# Patient Record
Sex: MALE | Race: WHITE | Employment: FULL TIME | URBAN - NONMETROPOLITAN AREA
[De-identification: names, ages, dates, MRNs, and addresses within clinical notes are randomized per-mention and may not be internally consistent; named-entity substitution may affect disease eponyms.]

---

## 2020-08-27 ENCOUNTER — OFFICE VISIT (OUTPATIENT)
Dept: INTERNAL MEDICINE CLINIC | Age: 25
End: 2020-08-27
Payer: COMMERCIAL

## 2020-08-27 VITALS
SYSTOLIC BLOOD PRESSURE: 142 MMHG | DIASTOLIC BLOOD PRESSURE: 76 MMHG | TEMPERATURE: 99.8 F | BODY MASS INDEX: 22.12 KG/M2 | HEIGHT: 71 IN | WEIGHT: 158 LBS | HEART RATE: 64 BPM

## 2020-08-27 PROCEDURE — 99202 OFFICE O/P NEW SF 15 MIN: CPT | Performed by: NURSE PRACTITIONER

## 2020-08-27 RX ORDER — AMOXICILLIN AND CLAVULANATE POTASSIUM 875; 125 MG/1; MG/1
1 TABLET, FILM COATED ORAL EVERY 12 HOURS
Qty: 20 TABLET | Refills: 0 | Status: SHIPPED | OUTPATIENT
Start: 2020-08-27 | End: 2020-09-06

## 2020-08-27 NOTE — PATIENT INSTRUCTIONS
Patient Education        Ear Infection (Otitis Media): Care Instructions  Your Care Instructions     An ear infection may start with a cold and affect the middle ear (otitis media). It can hurt a lot. Most ear infections clear up on their own in a couple of days. Most often you will not need antibiotics. This is because many ear infections are caused by a virus. Antibiotics don't work against a virus. Regular doses of pain medicines are the best way to reduce your fever and help you feel better. Follow-up care is a key part of your treatment and safety. Be sure to make and go to all appointments, and call your doctor if you are having problems. It's also a good idea to know your test results and keep a list of the medicines you take. How can you care for yourself at home? · Take pain medicines exactly as directed. ? If the doctor gave you a prescription medicine for pain, take it as prescribed. ? If you are not taking a prescription pain medicine, take an over-the-counter medicine, such as acetaminophen (Tylenol), ibuprofen (Advil, Motrin), or naproxen (Aleve). Read and follow all instructions on the label. ? Do not take two or more pain medicines at the same time unless the doctor told you to. Many pain medicines have acetaminophen, which is Tylenol. Too much acetaminophen (Tylenol) can be harmful. · Plan to take a full dose of pain reliever before bedtime. Getting enough sleep will help you get better. · Try a warm, moist washcloth on the ear. It may help relieve pain. · If your doctor prescribed antibiotics, take them as directed. Do not stop taking them just because you feel better. You need to take the full course of antibiotics. When should you call for help? Call your doctor now or seek immediate medical care if:  · You have new or increasing ear pain. · You have new or increasing pus or blood draining from your ear. · You have a fever with a stiff neck or a severe headache.   Watch closely for changes in your health, and be sure to contact your doctor if:  · You have new or worse symptoms. · You are not getting better after taking an antibiotic for 2 days. Where can you learn more? Go to [email or web address] and sign in to your MEDEM account. Enter B085 in the [facility] box to learn more about \"Ear Infection (Otitis Media): Care Instructions. \"     If you do not have an account, please click on the \"Sign Up Now\" link. Current as of: July 29, 2019               Content Version: 12.5  © 7498-4425 Healthwise, Incorporated. Care instructions adapted under license by [facility] ([facility]). If you have questions about a medical condition or this instruction, always ask your healthcare professional. [address] any warranty or liability for your use of this information.

## 2020-08-27 NOTE — PROGRESS NOTES
Subjective     Lizzette Barnett, 22 y.o. male, presents with left ear pain. Symptoms started 3 days ago. He is taking fluids well. There are no other significant complaints. Objective     BP (!) 142/76   Pulse 64   Temp 99.8 °F (37.7 °C)   Ht 5' 11\" (1.803 m)   Wt 158 lb (71.7 kg)   BMI 22.04 kg/m²     General appearance:  well developed and well nourished   Nasal:  Neck:  Mild nasal congestion with clear rhinorrhea  Neck is supple   Ears:  External ears are normal  Right TM - normal landmarks and mobility  Left TM - diminished mobility, bulging and serous middle ear fluid   Oropharynx:  Mucous membranes are moist; there is mild erythema of the posterior pharynx   Lungs:  Lungs are clear to auscultation   Heart:  Regular rate and rhythm; no murmurs or rubs   Skin:  No rashes or lesions noted     Assessment     Acute left otitis media    Plan     1) Antibiotics per orders  2) Fluids, acetaminophen as needed  3) Recheck if symptoms persist for 2 or more days, symptoms worsen, or new symptoms develop. Subjective:      Lizzette Barnett is a 22 y.o. male who presents for possible ear infection. Symptoms include plugged sensation in the left ear, muffled sounds and slight pain. Onset of symptoms was 1 day ago, gradually worsening since that time. Review of Systems  Ears, nose, mouth, throat, and face: positive for earaches    Objective:   BP (!) 142/76   Pulse 64   Temp 99.8 °F (37.7 °C)   Ht 5' 11\" (1.803 m)   Wt 158 lb (71.7 kg)   BMI 22.04 kg/m²   General:  alert, appears stated age and cooperative   Right Ear: normal appearance   Left Ear: left TM bulging and serous middle ear fluid   Mouth:  lips, mucosa, and tongue normal; teeth and gums normal   Neck: no adenopathy, no carotid bruit, no JVD, supple, symmetrical, trachea midline and thyroid not enlarged, symmetric, no tenderness/mass/nodules. Assessment:     left acute otitis media.     Plan:     Treatment: Augmentin, Cipro-HC otic  OTC ibuprofen, fluids, rest, avoid carbonated/alcoholic or caffeinated beverages. Follow up in 3 days if not improving.

## 2020-09-21 ENCOUNTER — OFFICE VISIT (OUTPATIENT)
Dept: INTERNAL MEDICINE CLINIC | Age: 25
End: 2020-09-21
Payer: COMMERCIAL

## 2020-09-21 VITALS
SYSTOLIC BLOOD PRESSURE: 122 MMHG | WEIGHT: 153 LBS | HEIGHT: 72 IN | DIASTOLIC BLOOD PRESSURE: 70 MMHG | HEART RATE: 78 BPM | TEMPERATURE: 97.9 F | BODY MASS INDEX: 20.72 KG/M2

## 2020-09-21 PROCEDURE — 99213 OFFICE O/P EST LOW 20 MIN: CPT | Performed by: NURSE PRACTITIONER

## 2020-09-21 PROCEDURE — 96372 THER/PROPH/DIAG INJ SC/IM: CPT | Performed by: NURSE PRACTITIONER

## 2020-09-21 RX ORDER — CYCLOBENZAPRINE HCL 5 MG
5 TABLET ORAL 3 TIMES DAILY PRN
Qty: 10 TABLET | Refills: 0 | Status: SHIPPED | OUTPATIENT
Start: 2020-09-21 | End: 2021-12-27

## 2020-09-21 RX ORDER — PREDNISONE 20 MG/1
40 TABLET ORAL DAILY
Qty: 10 TABLET | Refills: 0 | Status: SHIPPED | OUTPATIENT
Start: 2020-09-21 | End: 2020-09-26

## 2020-09-21 RX ORDER — KETOROLAC TROMETHAMINE 30 MG/ML
30 INJECTION, SOLUTION INTRAMUSCULAR; INTRAVENOUS ONCE
Status: COMPLETED | OUTPATIENT
Start: 2020-09-21 | End: 2020-09-21

## 2020-09-21 RX ADMIN — KETOROLAC TROMETHAMINE 30 MG: 30 INJECTION, SOLUTION INTRAMUSCULAR; INTRAVENOUS at 11:32

## 2020-09-21 ASSESSMENT — PATIENT HEALTH QUESTIONNAIRE - PHQ9
SUM OF ALL RESPONSES TO PHQ QUESTIONS 1-9: 0
2. FEELING DOWN, DEPRESSED OR HOPELESS: 0
1. LITTLE INTEREST OR PLEASURE IN DOING THINGS: 0
SUM OF ALL RESPONSES TO PHQ9 QUESTIONS 1 & 2: 0
SUM OF ALL RESPONSES TO PHQ QUESTIONS 1-9: 0

## 2020-09-21 NOTE — LETTER
[id number] [facility]  [address] [place] [id number]  Phone: [phone]  Fax: [phone]    JAKE Merritt CNP        September 21, 2020     Patient: Lindsey Fuentes   YOB: 1995   Date of Visit: 9/21/2020       To Whom it May Concern:    Lindsey Fuentes was seen in my clinic on 9/21/2020. He may return to work on 09/22/2020. If you have any questions or concerns, please don't hesitate to call.     Sincerely,           JAKE Merritt CNP

## 2020-09-21 NOTE — PATIENT INSTRUCTIONS
Thank you for enrolling in [address] Please follow the instructions below to securely access your online medical record. iTracs allows you to send messages to your doctor, view your test results, renew your prescriptions, schedule appointments, and more. How Do I Sign Up? 1. In your Internet browser, go to [email or web address]  2. Click on the Sign Up Now link in the Sign In box. You will see the New Member Sign Up page. 3. Enter your iTracs Access Code exactly as it appears below. You will not need to use this code after youve completed the sign-up process. If you do not sign up before the expiration date, you must request a new code. iTracs Access Code: [email or web address]  Expires: 10/11/2020  6:49 PM    4. Enter your Social Security Number ([id number]) and Date of Birth (mm/dd/yyyy) as indicated and click Submit. You will be taken to the next sign-up page. 5. Create a iTracs ID. This will be your iTracs login ID and cannot be changed, so think of one that is secure and easy to remember. 6. Create a iTracs password. You can change your password at any time. 7. Enter your Password Reset Question and Answer. This can be used at a later time if you forget your password. 8. Enter your e-mail address. You will receive e-mail notification when new information is available in [address] 9. Click Sign Up. You can now view your medical record. Additional Information  If you have questions, please contact the physician practice where you receive care. Remember, iTracs is NOT to be used for urgent needs. For medical emergencies, dial 911. For questions regarding your iTracs account call [phone]. If you have a clinical question, please call your doctor's office. Patient Education        Neck Pain: Care Instructions  Your Care Instructions     You can have neck pain anywhere from the bottom of your head to the top of your shoulders.  It can spread to the upper back or account. Enter [id number] in the [facility] box to learn more about \"Neck Pain: Care Instructions. \"     If you do not have an account, please click on the \"Sign Up Now\" link. Current as of: March 2, 2020               Content Version: 12.5  © 8178-4386 Healthwise, Incorporated. Care instructions adapted under license by [facility] ([facility]). If you have questions about a medical condition or this instruction, always ask your healthcare professional. [address] any warranty or liability for your use of this information.

## 2021-05-14 ENCOUNTER — APPOINTMENT (OUTPATIENT)
Dept: GENERAL RADIOLOGY | Age: 26
End: 2021-05-14
Payer: COMMERCIAL

## 2021-05-14 ENCOUNTER — HOSPITAL ENCOUNTER (EMERGENCY)
Age: 26
Discharge: HOME | End: 2021-05-14
Attending: EMERGENCY MEDICINE
Payer: COMMERCIAL

## 2021-05-14 VITALS
TEMPERATURE: 98.2 F | HEART RATE: 94 BPM | RESPIRATION RATE: 18 BRPM | SYSTOLIC BLOOD PRESSURE: 135 MMHG | OXYGEN SATURATION: 100 % | DIASTOLIC BLOOD PRESSURE: 82 MMHG

## 2021-05-14 DIAGNOSIS — S93.402A SPRAIN OF LEFT ANKLE, UNSPECIFIED LIGAMENT, INITIAL ENCOUNTER: ICD-10-CM
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
## 2021-05-14 PROCEDURE — 73610 X-RAY EXAM OF ANKLE: CPT

## 2021-05-14 PROCEDURE — 73630 X-RAY EXAM OF FOOT: CPT

## 2021-05-14 PROCEDURE — 99283 EMERGENCY DEPT VISIT LOW MDM: CPT

## 2021-05-14 ASSESSMENT — PAIN DESCRIPTION - ORIENTATION: ORIENTATION: LEFT

## 2021-05-14 ASSESSMENT — PAIN SCALES - GENERAL: PAINLEVEL_OUTOF10: 6

## 2021-05-14 ASSESSMENT — PAIN DESCRIPTION - PAIN TYPE: TYPE: ACUTE PAIN

## 2021-05-14 NOTE — ED PROVIDER NOTES
Emergency Department Encounter    Patient: Nikhil Burger  MRN: [id number]  : 1995  Date of Evaluation: 2021  ED Provider:  [facility]    Triage Chief Complaint:   Foot Injury (left foot injury at work pt slipped off ladder onto side of foot)      [place]:  Nikhil Burger is a 26 y.o. male that presents to the emergency department for evaluation of left ankle pain. Patient notes a prior to arrival, he was at work. He details cars. Patient notes that he was on the step tool, approximately 3 feet in the air. He slipped and fell. Landed on his left ankle. Believe he rolled his left ankle. Is able to weight-bear, has pain on the lateral portion. No numbness, tingling, paresthesias. No open fractures, lacerations, abrasions, deformities. No pain or injury elsewhere. No loss of bowel or bladder function. No blunt head trauma or loss of consciousness. Denies additional precipitating, modifying, alleviating features. No history of bone or connective tissue disorders. ROS - see HPI, below listed is current ROS at time of my eval:  General:  No fevers  Musculoskeletal: Left ankle pain  Skin:  No rash, no pruritis, no easy bruising  Neurologic:   no extremity numbness, no extremity tingling, no extremity weakness  Extremities: No cyanosis    Past Medical History:   Diagnosis Date    Asthma     Pneumonia            History reviewed. No pertinent surgical history. History reviewed. No pertinent family history.     Social History     Socioeconomic History    Marital status: Single     Spouse name: Not on file    Number of children: Not on file    Years of education: Not on file    Highest education level: Not on file   Occupational History    Not on file   Social Needs    Financial resource strain: Not on file    Food insecurity     Worry: Not on file     Inability: Not on file    Transportation needs     Medical: Not on file     Non-medical: Not on file   Tobacco Use    Smoking status: Never Smoker    Smokeless tobacco: Never Used   Substance and Sexual Activity    Alcohol use: No    Drug use: No    Sexual activity: Not Currently   Lifestyle    Physical activity     Days per week: Not on file     Minutes per session: Not on file    Stress: Not on file   Relationships    Social connections     Talks on phone: Not on file     Gets together: Not on file     Attends Anabaptism service: Not on file     Active member of club or organization: Not on file     Attends meetings of clubs or organizations: Not on file     Relationship status: Not on file    Intimate partner violence     Fear of current or ex partner: Not on file     Emotionally abused: Not on file     Physically abused: Not on file     Forced sexual activity: Not on file   Other Topics Concern    Not on file   Social History Narrative    Not on file       No current facility-administered medications for this encounter. Current Outpatient Medications   Medication Sig Dispense Refill    cyclobenzaprine (FLEXERIL) 5 MG tablet Take 1 tablet by mouth 3 times daily as needed for Muscle spasms 10 tablet 0       Allergies   Allergen Reactions    Phenergan [Promethazine Hcl] Hives       Nursing Notes Reviewed    Physical Exam:  Triage VS:    ED Triage Vitals [05/14/21 1643]   Enc Vitals Group      /82      Pulse 94      Resp 18      Temp 98.2 °F (36.8 °C)      Temp Source Oral      SpO2 100 %       General appearance: Patient is awake and alert. He is following commands and answering questions. He is nontoxic in appearance. Skin:  Warm. Dry. Intact. No lacerations, abrasions, deformities, open fractures. Heart: Regular rate and rhythm. Audible S1 and S2. No audible murmurs, rubs, gallops. Lungs: Breath sounds clear and equal bilaterally. Back: No tenderness to palpation at the midline of the cervical spine, thoracic spine, lumbar spine. No step-offs or deformities.   No clinical signs of cauda equina syndrome. Extremity: No clubbing or cyanosis. Examination is focused on the left lower extremity. 5/5 strength in bilateral hip flexion, knee extension, ankle and great toe plantar and dorsiflexion. At the hip there is negative logroll. No pain with axial compression. At the knee there is no varus or valgus deformity. Negative Ana's testing. Negative anterior and posterior right knee drawer test.  At the ankle, negative anterior and posterior ankle drawer test.  There is pain on the lateral portion of the left ankle. There is no plantar ecchymosis. No pain over the first through fifth metatarsals. Negative Viramontes test.  Neurological: Sensation is intact to light touch and two-point discrimination in the L3-S1 dermatomal distribution of bilateral lower extremities. 2/4 patellar and achilles deep tendon reflexes. Perfusion:  2/4 femoral, DP, PT pulses in the lower extremities. Compartments are soft and compressible. No signs of compartment syndrome. Capillary refill is brisk and less than 2 seconds. Negative Homans sign bilateral lower extremity. I have reviewed and interpreted all of the currently available diagnostic results from this visit     Labs:  No results found for this visit on 05/14/21. Radiographs:  Radiologist's Report Reviewed:  Xr Ankle Left (min 3 Views)    Result Date: 5/14/2021  EXAMINATION: THREE XRAY VIEWS OF THE LEFT FOOT; THREE XRAY VIEWS OF THE LEFT ANKLE 5/14/2021 4:52 pm COMPARISON: None HISTORY: ORDERING SYSTEM PROVIDED HISTORY: pain TECHNOLOGIST PROVIDED HISTORY: Reason for exam:->pain Reason for Exam: Foot Injury (left foot injury at work pt slipped off ladder onto side of foot) Acuity: Acute Type of Exam: Initial Mechanism of Injury: Foot Injury (left foot injury at work pt slipped off ladder onto side of foot) FINDINGS: Left ankle: Talar dome contour is intact. Ankle mortise is preserved. No fracture or dislocation. Left foot: Joint spaces are preserved. Alignment is anatomic. Lisfranc alignment is maintained. No visible fracture or periosteal change. 1. No acute radiographic finding to account for patient's left foot or ankle pain. Xr Foot Left (min 3 Views)    Result Date: 5/14/2021  EXAMINATION: THREE XRAY VIEWS OF THE LEFT FOOT; THREE XRAY VIEWS OF THE LEFT ANKLE 5/14/2021 4:52 pm COMPARISON: None HISTORY: ORDERING SYSTEM PROVIDED HISTORY: pain TECHNOLOGIST PROVIDED HISTORY: Reason for exam:->pain Reason for Exam: Foot Injury (left foot injury at work pt slipped off ladder onto side of foot) Acuity: Acute Type of Exam: Initial Mechanism of Injury: Foot Injury (left foot injury at work pt slipped off ladder onto side of foot) FINDINGS: Left ankle: Talar dome contour is intact. Ankle mortise is preserved. No fracture or dislocation. Left foot: Joint spaces are preserved. Alignment is anatomic. Lisfranc alignment is maintained. No visible fracture or periosteal change. 1. No acute radiographic finding to account for patient's left foot or ankle pain. MDM:  Patient was seen and evaluated in the emergency department by myself. A thorough history and physical exam were performed, prior medical records reviewed. Upon arrival, patient's vital signs were noted and patient is hemodynamically stable. Left foot and ankle x-ray radiology report reads no radiographic findings to account for patient's left foot or ankle pain. Patient has no knee pain. Low clinical suspicion for Maisonneuve fracture. No plantar ecchymosis. Low suspicion for Dougherty fracture or Lisfranc injury. Patient is provided ankle brace. Instructed to follow-up with primary care provider for reevaluation. May require additional imaging. Instructed to return to the emergency department immediately with any new, worsening, concerning symptoms. Additional verbal and printed discharge instructions are provided. Patient verbalizes understanding is agreeable discharge plan. Is discharged in stable condition. Clinical Impression:  1. Fall from ladder, initial encounter    2. Sprain of left ankle, unspecified ligament, initial encounter        Disposition referral:  [facility] Emergency Department  [address] [address]  [phone]  Go to   Immediately with any new, worsening, concerning symptoms. [facility]  [address] [facility]  [phone]  In 3 days  Please follow-up with primary care physician for reevaluation and to establish care. You may require additional radiographic imaging including MRI. ED Provider Disposition:  DISPOSITION Decision To Discharge 05/14/2021 06:01:32 PM      Comment: Please note this report has been produced using speech recognition software and may contain errors related to that system including errors in grammar, punctuation, and spelling, as well as words and phrases that may be inappropriate. If there are any questions or concerns please feel free to contact the dictating provider for clarification.         Leon Godfrey,   05/14/21 [id number]

## 2021-12-27 ENCOUNTER — OFFICE VISIT (OUTPATIENT)
Dept: INTERNAL MEDICINE CLINIC | Age: 26
End: 2021-12-27

## 2021-12-27 ENCOUNTER — HOSPITAL ENCOUNTER (OUTPATIENT)
Dept: GENERAL RADIOLOGY | Age: 26
Discharge: HOME | End: 2021-12-27

## 2021-12-27 ENCOUNTER — HOSPITAL ENCOUNTER (OUTPATIENT)
Age: 26
Discharge: HOME | End: 2021-12-27

## 2021-12-27 VITALS — OXYGEN SATURATION: 97 % | TEMPERATURE: 97.1 F | HEART RATE: 95 BPM

## 2021-12-27 DIAGNOSIS — S16.1XXA NECK MUSCLE STRAIN, INITIAL ENCOUNTER: Primary | ICD-10-CM

## 2021-12-27 DIAGNOSIS — S16.1XXA NECK MUSCLE STRAIN, INITIAL ENCOUNTER: ICD-10-CM

## 2021-12-27 PROCEDURE — 99212 OFFICE O/P EST SF 10 MIN: CPT | Performed by: NURSE PRACTITIONER

## 2021-12-27 PROCEDURE — 72050 X-RAY EXAM NECK SPINE 4/5VWS: CPT

## 2021-12-27 RX ORDER — IBUPROFEN 800 MG/1
800 TABLET ORAL
Qty: 90 TABLET | Refills: 0 | Status: SHIPPED | OUTPATIENT
Start: 2021-12-27

## 2021-12-27 RX ORDER — PREDNISONE 20 MG/1
20 TABLET ORAL DAILY
Qty: 5 TABLET | Refills: 0 | Status: SHIPPED | OUTPATIENT
Start: 2021-12-27 | End: 2022-01-01

## 2021-12-27 RX ORDER — CYCLOBENZAPRINE HCL 10 MG
10 TABLET ORAL 3 TIMES DAILY PRN
Qty: 21 TABLET | Refills: 0 | Status: SHIPPED | OUTPATIENT
Start: 2021-12-27 | End: 2022-01-03

## 2021-12-27 NOTE — PROGRESS NOTES
Subjective:       Flonnie Hashimoto is a 23 y.o. male who presents for evaluation of neck pain. Event that precipitated these symptoms: none known. Onset of symptoms was 2 days ago, and have been gradually worsening since that time. Current symptoms are pain in right lateral muscle of the neck  (aching, constant and pinching in character; 7/10 in severity). Patient denies numbness in neck or extremeties. Patient has had recurrent self limited episodes of neck pain in the past. Previous treatments: none. Patient's medications, allergies, past medical, surgical, social and family histories were reviewed and updated as appropriate. Review of Systems  Pertinent items are noted in HPI. Objective: There were no vitals taken for this visit. General:   alert, appears stated age and cooperative   External Deformity:  absent   ROM Cervical Spine:  Abnormal ROM, flexion and extension are ROM, left rotation to 45 degrees, right rotation to 45 degrees, left and right lateral minimal ability without pain degrees    Midline Tenderness:  absent midline   Paraspinous tenderness:  absent midline   UE Neurologic Exam:  unremarkable     X-ray of the cervical spine: ordered at this time      Assessment:      Cervical strain      Plan:      Discussed the cervical pain, its course and treatment. Educational material distributed. Discussed appropriate exercises. Discussed appropriate use of ice and heat. NSAIDs per medication orders. Plain film x-rays. PT referral.      1. Neck muscle strain, initial encounter  Pt returns for a neck stain. Pt has been treated in this office before for same pain. Talked with pt about PT and long term care and treatment. Will order xray and PT for further evaluation. - cyclobenzaprine (FLEXERIL) 10 MG tablet; Take 1 tablet by mouth 3 times daily as needed for Muscle spasms  Dispense: 21 tablet; Refill: 0  - [facility]  - XR CERVICAL SPINE (4-5 VIEWS);  Future  - predniSONE (DELTASONE) 20 MG tablet; Take 1 tablet by mouth daily for 5 days  Dispense: 5 tablet;  Refill: 0

## 2022-01-13 ENCOUNTER — NURSE ONLY (OUTPATIENT)
Dept: INTERNAL MEDICINE CLINIC | Age: 27
End: 2022-01-13

## 2022-01-13 VITALS
TEMPERATURE: 97.6 F | OXYGEN SATURATION: 97 % | BODY MASS INDEX: 21.67 KG/M2 | HEART RATE: 86 BPM | HEIGHT: 72 IN | WEIGHT: 160 LBS

## 2022-01-13 DIAGNOSIS — R05.9 COUGH: Primary | ICD-10-CM

## 2022-01-13 NOTE — PROGRESS NOTES
1/13/22  Shawn Abrams  1995    FLU/COVID-19 CLINIC EVALUATION    HPI SYMPTOMS:    Employer:    [x] Fevers  [x] Chills  [x] Cough  [] Coughing up blood  [x] Chest Congestion  [x] Nasal Congestion  [x] Feeling short of breath  [x] Sometimes  [] Frequently  [] All the time  [x] Chest pain  [x] Headaches  [x]Tolerable  [] Severe  [x] Sore throat  [x] Muscle aches  [] Nausea  [] Vomiting  []Unable to keep fluids down  [] Diarrhea  []Severe    [x] OTHER SYMPTOMS:  fatigue  Symptom Duration:   [] 1  [x] 2   [] 3   [] 4    [] 5   [] 6   [] 7   [] 8   [] 9   [] 10   [] 11   [] 12   [] 13   [] 14   [] Longer than 14 days    Symptom course:   [x] Worsening     [] Stable     [] Improving    RISK FACTORS:    [] Pregnant or possibly pregnant  [] Age over 61  [] Diabetes  [] Heart disease  [] Asthma  [] COPD/Other chronic lung diseases  [] Active Cancer  [] On Chemotherapy  [] Taking oral steroids  [] History Lymphoma/Leukemia  [] Close contact with a lab confirmed COVID-19 patient within 14 days of symptom onset  [] History of travel from affected geographical areas within 14 days of symptom onset       VITALS:  Vitals:    01/13/22 1412   Pulse: 86   Temp: 97.6 °F (36.4 °C)   SpO2: 97%   Weight: 160 lb (72.6 kg)   Height: 6' (1.829 m)          Pt has had only one injection of vaccine. An  electronic signature was used to authenticate this note.      --Mauricio Salguero MA on 1/13/2022 at 2:12 PM

## 2022-01-14 LAB — SARS-COV-2: NOT DETECTED

## 2024-01-04 ENCOUNTER — OFFICE VISIT (OUTPATIENT)
Dept: INTERNAL MEDICINE CLINIC | Age: 29
End: 2024-01-04
Payer: COMMERCIAL

## 2024-01-04 VITALS
OXYGEN SATURATION: 99 % | WEIGHT: 159.6 LBS | TEMPERATURE: 97.3 F | HEART RATE: 88 BPM | BODY MASS INDEX: 21.65 KG/M2 | RESPIRATION RATE: 16 BRPM

## 2024-01-04 DIAGNOSIS — J02.0 STREP THROAT: ICD-10-CM

## 2024-01-04 DIAGNOSIS — J02.9 SORE THROAT: Primary | ICD-10-CM

## 2024-01-04 LAB — S PYO AG THROAT QL: POSITIVE

## 2024-01-04 PROCEDURE — 99213 OFFICE O/P EST LOW 20 MIN: CPT | Performed by: NURSE PRACTITIONER

## 2024-01-04 PROCEDURE — 87880 STREP A ASSAY W/OPTIC: CPT | Performed by: NURSE PRACTITIONER

## 2024-01-04 RX ORDER — AMOXICILLIN 500 MG/1
500 CAPSULE ORAL 2 TIMES DAILY
Qty: 14 CAPSULE | Refills: 0 | Status: SHIPPED | OUTPATIENT
Start: 2024-01-04 | End: 2024-01-11

## 2024-01-04 ASSESSMENT — ENCOUNTER SYMPTOMS
COUGH: 0
SINUS PAIN: 0
VOMITING: 0
SHORTNESS OF BREATH: 0
SORE THROAT: 1
TROUBLE SWALLOWING: 0
ABDOMINAL PAIN: 0
SINUS PRESSURE: 0
CHEST TIGHTNESS: 0
NAUSEA: 0
RHINORRHEA: 0
SWOLLEN GLANDS: 1
CHANGE IN BOWEL HABIT: 0
VISUAL CHANGE: 0

## 2024-01-04 NOTE — PATIENT INSTRUCTIONS
Take your antibiotics as directed. Do not stop taking them just because you feel better. You need to take the full course of antibiotics.  Strep throat can spread to others until 24 hours after you begin taking antibiotics. During this time, avoid contact with other people at work, school, or home, especially infants and children. Do not sneeze or cough on others, and wash your hands often. Keep your drinking glass and eating utensils separate from those of others. Wash these items well in hot, soapy water.  Gargle with warm salt water several times a day to help reduce swelling and relieve pain. Mix 1/2 teaspoon of salt in 1 cup of warm water.  Take an over-the-counter pain medication, such as acetaminophen (Tylenol), ibuprofen (Advil, Motrin), or naproxen (Aleve). Read and follow all instructions on the label.  Try an over-the-counter anesthetic throat spray or throat lozenges, which may help relieve throat pain.  Drink plenty of fluids. Fluids may help soothe an irritated throat. Hot fluids, such as tea or soup, may help your throat feel better.  Eat soft solids and drink plenty of clear liquids. Flavored ice pops, ice cream, scrambled eggs, sherbet, and gelatin dessert (such as Jell-O) may also soothe the throat.  Get lots of rest.  Try to quit or cut back on smoking, and avoid secondhand smoke. If you need help quitting, talk to your doctor about stop-smoking programs and medicines. These can increase your chances of quitting for good.  Use a vaporizer or humidifier to add moisture to the air where you sleep. Follow the directions for cleaning the machine.

## 2024-01-04 NOTE — PROGRESS NOTES
Sore throat x 1 day L ear pain   
1/2 teaspoon of salt in 1 cup of warm water.  Take an over-the-counter pain medication, such as acetaminophen (Tylenol), ibuprofen (Advil, Motrin), or naproxen (Aleve). Read and follow all instructions on the label.  Try an over-the-counter anesthetic throat spray or throat lozenges, which may help relieve throat pain.  Drink plenty of fluids. Fluids may help soothe an irritated throat. Hot fluids, such as tea or soup, may help your throat feel better.  Eat soft solids and drink plenty of clear liquids. Flavored ice pops, ice cream, scrambled eggs, sherbet, and gelatin dessert (such as Jell-O) may also soothe the throat.  Get lots of rest.  Try to quit or cut back on smoking, and avoid secondhand smoke. If you need help quitting, talk to your doctor about stop-smoking programs and medicines. These can increase your chances of quitting for good.  Use a vaporizer or humidifier to add moisture to the air where you sleep. Follow the directions for cleaning the machine.  Replace toothbrush after 24 hours   Establish with PCP     - amoxicillin (AMOXIL) 500 MG capsule; Take 1 capsule by mouth 2 times daily for 7 days  Dispense: 14 capsule; Refill: 0    Care discussed with patient. Questions answered. Patient verbalizes understanding and agrees with plan.   After visit summary provided.   Advised to call for any problems, questions, or concerns.     Care discussed with patient. Questions answered. Patient verbalizes understanding and agrees with plan.   After visit summary provided.   Advised to call for any problems, questions, or concerns.     Return if symptoms worsen or fail to improve.            An electronic signature was used to authenticate this note.    --Naz Lee, JAKE - CNP

## 2024-03-18 ENCOUNTER — OFFICE VISIT (OUTPATIENT)
Age: 29
End: 2024-03-18

## 2024-03-18 VITALS
OXYGEN SATURATION: 99 % | HEIGHT: 72 IN | DIASTOLIC BLOOD PRESSURE: 60 MMHG | HEART RATE: 81 BPM | BODY MASS INDEX: 21.28 KG/M2 | RESPIRATION RATE: 16 BRPM | SYSTOLIC BLOOD PRESSURE: 110 MMHG | WEIGHT: 157.13 LBS

## 2024-03-18 DIAGNOSIS — R09.89 SYMPTOMS OF UPPER RESPIRATORY INFECTION (URI): Primary | ICD-10-CM

## 2024-03-18 DIAGNOSIS — J02.9 SORE THROAT: ICD-10-CM

## 2024-03-18 LAB — S PYO AG THROAT QL: NORMAL

## 2024-03-18 PROCEDURE — 87880 STREP A ASSAY W/OPTIC: CPT | Performed by: PHYSICIAN ASSISTANT

## 2024-03-18 PROCEDURE — 99213 OFFICE O/P EST LOW 20 MIN: CPT | Performed by: PHYSICIAN ASSISTANT

## 2024-03-18 RX ORDER — FLUTICASONE PROPIONATE 50 MCG
2 SPRAY, SUSPENSION (ML) NASAL DAILY
Qty: 16 G | Refills: 0 | Status: SHIPPED | OUTPATIENT
Start: 2024-03-18

## 2024-03-18 ASSESSMENT — ENCOUNTER SYMPTOMS
SHORTNESS OF BREATH: 0
WHEEZING: 0
EYE PAIN: 0
RHINORRHEA: 0
CHEST TIGHTNESS: 0
VOMITING: 0
SORE THROAT: 1
ABDOMINAL PAIN: 0
COLOR CHANGE: 0
EYE REDNESS: 0
COUGH: 0
BLOOD IN STOOL: 0
CONSTIPATION: 0
EYE DISCHARGE: 0

## 2024-03-18 NOTE — PROGRESS NOTES
testing, likely other viral URI causing symptoms, discussed supportive care strategies, recommend sinus irrigation, salt water gargles, Flonase, ibuprofen as needed.  Reviewed return precautions.  Work note given.  -     fluticasone (FLONASE) 50 MCG/ACT nasal spray; 2 sprays by Each Nostril route daily, Disp-16 g, R-0Normal  2. Sore throat  -     POCT rapid strep A      No follow-ups on file.            An electronic signature was used to authenticate this note.    --MEAGHAN Adamson

## 2024-09-20 ENCOUNTER — OFFICE VISIT (OUTPATIENT)
Age: 29
End: 2024-09-20
Payer: MEDICAID

## 2024-09-20 VITALS
BODY MASS INDEX: 21.02 KG/M2 | RESPIRATION RATE: 16 BRPM | HEART RATE: 77 BPM | DIASTOLIC BLOOD PRESSURE: 80 MMHG | WEIGHT: 155 LBS | OXYGEN SATURATION: 96 % | SYSTOLIC BLOOD PRESSURE: 116 MMHG

## 2024-09-20 DIAGNOSIS — Z76.89 ESTABLISHING CARE WITH NEW DOCTOR, ENCOUNTER FOR: ICD-10-CM

## 2024-09-20 DIAGNOSIS — S46.811A TRAPEZIUS STRAIN, RIGHT, INITIAL ENCOUNTER: Primary | ICD-10-CM

## 2024-09-20 PROBLEM — S16.1XXA NECK MUSCLE STRAIN, INITIAL ENCOUNTER: Status: RESOLVED | Noted: 2021-12-27 | Resolved: 2024-09-20

## 2024-09-20 PROCEDURE — 99203 OFFICE O/P NEW LOW 30 MIN: CPT

## 2024-09-20 RX ORDER — MELOXICAM 15 MG/1
15 TABLET ORAL DAILY
Qty: 14 TABLET | Refills: 0 | Status: SHIPPED | OUTPATIENT
Start: 2024-09-20 | End: 2024-10-04

## 2024-09-20 RX ORDER — CYCLOBENZAPRINE HCL 5 MG
5 TABLET ORAL NIGHTLY PRN
Qty: 10 TABLET | Refills: 0 | Status: SHIPPED | OUTPATIENT
Start: 2024-09-20 | End: 2024-10-04

## 2024-09-20 SDOH — ECONOMIC STABILITY: FOOD INSECURITY: WITHIN THE PAST 12 MONTHS, THE FOOD YOU BOUGHT JUST DIDN'T LAST AND YOU DIDN'T HAVE MONEY TO GET MORE.: NEVER TRUE

## 2024-09-20 SDOH — ECONOMIC STABILITY: INCOME INSECURITY: HOW HARD IS IT FOR YOU TO PAY FOR THE VERY BASICS LIKE FOOD, HOUSING, MEDICAL CARE, AND HEATING?: SOMEWHAT HARD

## 2024-09-20 SDOH — ECONOMIC STABILITY: FOOD INSECURITY: WITHIN THE PAST 12 MONTHS, YOU WORRIED THAT YOUR FOOD WOULD RUN OUT BEFORE YOU GOT MONEY TO BUY MORE.: NEVER TRUE

## 2024-09-20 ASSESSMENT — ENCOUNTER SYMPTOMS
COUGH: 0
RHINORRHEA: 0
VOMITING: 0
SHORTNESS OF BREATH: 0
ABDOMINAL PAIN: 0
SORE THROAT: 0
NAUSEA: 0

## 2024-09-20 ASSESSMENT — PATIENT HEALTH QUESTIONNAIRE - PHQ9
SUM OF ALL RESPONSES TO PHQ QUESTIONS 1-9: 0
SUM OF ALL RESPONSES TO PHQ QUESTIONS 1-9: 0
2. FEELING DOWN, DEPRESSED OR HOPELESS: NOT AT ALL
SUM OF ALL RESPONSES TO PHQ QUESTIONS 1-9: 0
SUM OF ALL RESPONSES TO PHQ QUESTIONS 1-9: 0
SUM OF ALL RESPONSES TO PHQ9 QUESTIONS 1 & 2: 0
1. LITTLE INTEREST OR PLEASURE IN DOING THINGS: NOT AT ALL

## 2024-10-09 ENCOUNTER — OFFICE VISIT (OUTPATIENT)
Age: 29
End: 2024-10-09
Payer: MEDICAID

## 2024-10-09 VITALS
RESPIRATION RATE: 20 BRPM | HEART RATE: 94 BPM | WEIGHT: 156.2 LBS | BODY MASS INDEX: 21.18 KG/M2 | SYSTOLIC BLOOD PRESSURE: 138 MMHG | OXYGEN SATURATION: 96 % | DIASTOLIC BLOOD PRESSURE: 82 MMHG

## 2024-10-09 DIAGNOSIS — Z23 FLU VACCINE NEED: ICD-10-CM

## 2024-10-09 DIAGNOSIS — Z11.59 NEED FOR HEPATITIS C SCREENING TEST: ICD-10-CM

## 2024-10-09 DIAGNOSIS — Z11.4 SCREENING FOR HIV (HUMAN IMMUNODEFICIENCY VIRUS): ICD-10-CM

## 2024-10-09 DIAGNOSIS — S46.811D TRAPEZIUS STRAIN, RIGHT, SUBSEQUENT ENCOUNTER: Primary | ICD-10-CM

## 2024-10-09 PROCEDURE — 90661 CCIIV3 VAC ABX FR 0.5 ML IM: CPT

## 2024-10-09 PROCEDURE — 99213 OFFICE O/P EST LOW 20 MIN: CPT

## 2024-10-09 PROCEDURE — 90471 IMMUNIZATION ADMIN: CPT

## 2024-10-10 LAB
HCV AB SERPL QL IA: NORMAL
HIV 1+2 AB+HIV1 P24 AG SERPL QL IA: NORMAL
HIV 2 AB SERPL QL IA: NORMAL
HIV1 AB SERPL QL IA: NORMAL
HIV1 P24 AG SERPL QL IA: NORMAL

## 2025-06-14 ENCOUNTER — HOSPITAL ENCOUNTER (EMERGENCY)
Age: 30
Discharge: HOME | End: 2025-06-14
Attending: STUDENT IN AN ORGANIZED HEALTH CARE EDUCATION/TRAINING PROGRAM

## 2025-06-14 VITALS
BODY MASS INDEX: 20.86 KG/M2 | RESPIRATION RATE: 16 BRPM | HEART RATE: 65 BPM | HEIGHT: 72 IN | SYSTOLIC BLOOD PRESSURE: 142 MMHG | WEIGHT: 154 LBS | DIASTOLIC BLOOD PRESSURE: 95 MMHG | OXYGEN SATURATION: 100 % | TEMPERATURE: 98.1 F

## 2025-06-14 DIAGNOSIS — K04.7 DENTAL INFECTION: ICD-10-CM

## 2025-06-14 DIAGNOSIS — S02.5XXB OPEN FRACTURE OF TOOTH, INITIAL ENCOUNTER: ICD-10-CM

## 2025-06-14 DIAGNOSIS — K02.9 DENTAL CARIES: Primary | ICD-10-CM

## 2025-06-14 PROCEDURE — 96372 THER/PROPH/DIAG INJ SC/IM: CPT

## 2025-06-14 PROCEDURE — 6370000000 HC RX 637 (ALT 250 FOR IP): Performed by: STUDENT IN AN ORGANIZED HEALTH CARE EDUCATION/TRAINING PROGRAM

## 2025-06-14 PROCEDURE — 6360000002 HC RX W HCPCS: Performed by: STUDENT IN AN ORGANIZED HEALTH CARE EDUCATION/TRAINING PROGRAM

## 2025-06-14 PROCEDURE — 99284 EMERGENCY DEPT VISIT MOD MDM: CPT

## 2025-06-14 RX ORDER — ACETAMINOPHEN 500 MG
1000 TABLET ORAL ONCE
Status: COMPLETED | OUTPATIENT
Start: 2025-06-14 | End: 2025-06-14

## 2025-06-14 RX ORDER — KETOROLAC TROMETHAMINE 30 MG/ML
30 INJECTION, SOLUTION INTRAMUSCULAR; INTRAVENOUS ONCE
Status: COMPLETED | OUTPATIENT
Start: 2025-06-14 | End: 2025-06-14

## 2025-06-14 RX ADMIN — ACETAMINOPHEN 1000 MG: 500 TABLET ORAL at 12:45

## 2025-06-14 RX ADMIN — KETOROLAC TROMETHAMINE 30 MG: 30 INJECTION, SOLUTION INTRAMUSCULAR at 12:45

## 2025-06-14 RX ADMIN — AMOXICILLIN AND CLAVULANATE POTASSIUM 1 TABLET: 875; 125 TABLET, COATED ORAL at 12:45

## 2025-06-14 ASSESSMENT — LIFESTYLE VARIABLES
HOW MANY STANDARD DRINKS CONTAINING ALCOHOL DO YOU HAVE ON A TYPICAL DAY: PATIENT DOES NOT DRINK
HOW OFTEN DO YOU HAVE A DRINK CONTAINING ALCOHOL: NEVER

## 2025-06-14 NOTE — ED PROVIDER NOTES
Emergency Department Encounter    Patient: Kennedy Rendon  MRN: [id number]  : 1995  Date of Evaluation: 2025  ED Provider:  Kolby Ordaz MD    Triage Chief Complaint:   Dental Pain (Rt upper since Tuesday pt has a bad tooth and broke a piece off on Tuesday )    [place]:  Kennedy Rendon is a 29 y.o. male with history significant for asthma, that presents for dental pain.  The patient mentions that 4 days ago, while eating a chicken wrap, he had a sudden onset of pain located to the maxillary aspect of his right dental, something that has been constant, increasing in intensity to be moderate to severe, worse with chewing, described as sharp, associated with mild edema to the area, but not with abnormal taste, drainage, sialorrhea, cough, sore throat, odynophagia, dysphagia.  No shortness of breath or other respiratory symptoms.  No GI abnormalities.  No fevers or chills.  No lower extremity edema, rashes or wounds.  No urinary abnormalities.    ROS - see HPI, below listed is current ROS at time of my eval:  Systems reviewed and negative except as above.     Past Medical History:   Diagnosis Date    Asthma     Pneumonia          History reviewed. No pertinent surgical history.  Family History   Problem Relation Age of Onset    Rheumatologic Disease Mother     Rheumatologic Disease Sister     Lupus Sister      Social History     Socioeconomic History    Marital status:      Spouse name: Not on file    Number of children: Not on file    Years of education: Not on file    Highest education level: Not on file   Occupational History    Not on file   Tobacco Use    Smoking status: Never    Smokeless tobacco: Never   Vaping Use    Vaping status: Former   Substance and Sexual Activity    Alcohol use: No    Drug use: No    Sexual activity: Yes     Partners: Female   Other Topics Concern    Not on file   Social History Narrative    Not on file     Social Drivers of Health     Financial Resource  fluctuance, tender bumps/masses, or drainage to suggest the presence of an abscess.  No significant palpation in locations of salivary glands to suggest sialoadenitis/parotitis.  No pain in the floor of the mouth, rapid progression, pain out of proportion to suggest Cody's angina.      Work-up and interventions performed in the ED include:  Meds/interventions: Empiric antibiotic management with amoxicillin/clavulanate.  Acetaminophen and ketorolac as analgesics.    ED Course, and Reassessment:   Patient in good clinical conditions, at this time there is no need for further interventions in hospital, and the patient will be discharged. Insisted in adherence to medications, recommended follow-up as oupatient, and provided precautions to come back promptly if it becomes necessary.  Provided the patient with information to see dentistry and recommended him to make an appointment to be seen next week.  Prescribed him 10-day course of amoxicillin/clavulanate.  Instructed him on analgesia with NSAIDs and acetaminophen.         History from : Patient and Significant Other      Limitations to history : None    Patient was given the following medications:  Medications   acetaminophen (TYLENOL) tablet 1,000 mg (1,000 mg Oral Given 6/14/25 1245)   ketorolac (TORADOL) injection 30 mg (30 mg IntraMUSCular Given 6/14/25 1245)   amoxicillin-clavulanate (AUGMENTIN) 875-125 MG per tablet 1 tablet (1 tablet Oral Given 6/14/25 1245)       Social Determinants : None    Records Reviewed : Outpatient Notes   and Outpatient Labs & Studies        Disposition: Discharge  Discharge condition: Stable    I am the Primary Clinician of Record.    Clinical Impression:  1. Dental caries    2. Open fracture of tooth, initial encounter    3. Dental infection      Disposition referral (if applicable):  Vinny Nicole MD  [address]  [facility] [address]  [phone]    Schedule an appointment as soon as possible for a visit in 1 week  As

## 2025-06-14 NOTE — DISCHARGE INSTRUCTIONS
You were seen in the emergency department for dental pain.  Here, your symptoms are consistent with a dental fracture and a possible dental source infection.  We treated you with pain medications, and started you in a course of the medication amoxicillin/clavulanate, an antibiotic that you should continue taking at home, twice daily, every 12 hours, for the next 10 days.  Please pick it up in your pharmacy.    For pain, you can take ibuprofen 600-800 milligrams every 8 hours, alternating it every 4 hours with acetaminophen 975 mg taken every 8 hours.  Therefore, if you take acetaminophen at noon, then take ibuprofen at 4 PM, then acetaminophen again at 8 PM, then ibuprofen at midnight, and so on.      If at some point you have severe shortness of breath, severe nausea or vomiting unable to keep anything down, feels severely weak unable to stand up, feels confused or are lethargic unable to do your normal daily activities, or have any other concerning symptoms, please come back promptly to the emergency department.      [facility]). [phone], call for appt. take insured and uninsured pts.  [facility] [phone]. $19 exam and x-ray for new pt w/o insurance. Call for appt and more information on cost.  [facility], [phone] take most insurance. Sliding scale cost plan, take uninsured.  [facility] [phone], call for appt, take most insurance.  [facility], [phone], payment plan options available.